# Patient Record
Sex: MALE | Race: WHITE | Employment: FULL TIME | URBAN - METROPOLITAN AREA
[De-identification: names, ages, dates, MRNs, and addresses within clinical notes are randomized per-mention and may not be internally consistent; named-entity substitution may affect disease eponyms.]

---

## 2017-01-04 ENCOUNTER — TELEPHONE (OUTPATIENT)
Dept: SURGERY | Facility: CLINIC | Age: 37
End: 2017-01-04

## 2017-01-04 NOTE — TELEPHONE ENCOUNTER
Pt has vas scheduled in the office in march - asking if there is something sooner at [facility] or the surgery center

## 2017-01-04 NOTE — TELEPHONE ENCOUNTER
patient called and wanted an earlier appointment to have vasectomy that was scheduled in the office on 03/03/17.     Called patient informed that there is an earlier appointment available for 02/09/17 @ 2:00, at the Surgery Center[facility]Outpatient fo

## 2017-01-04 NOTE — TELEPHONE ENCOUNTER
Called patient informed that there is an earlier appointment available for 02/09/17 @ 2:00, at the Surgery Center[facility]Outpatient for Bilateral Vasectomy, patient was given time and date agreed   Nothing further is needed.

## 2017-01-05 NOTE — TELEPHONE ENCOUNTER
Please call patient and make sure that he understands that when vasectomies are performed at the surgery center, he should take the hydrocodone and the diazepam 30 minutes before actual start time of the procedure; if they tell him to only come to the surg

## 2017-02-09 ENCOUNTER — TELEPHONE (OUTPATIENT)
Dept: SURGERY | Facility: CLINIC | Age: 37
End: 2017-02-09

## 2017-02-09 ENCOUNTER — LAB REQUISITION (OUTPATIENT)
Dept: LAB | Facility: HOSPITAL | Age: 37
End: 2017-02-09
Payer: COMMERCIAL

## 2017-02-09 DIAGNOSIS — Z01.89 ENCOUNTER FOR OTHER SPECIFIED SPECIAL EXAMINATIONS: ICD-10-CM

## 2017-02-09 DIAGNOSIS — Z98.52 STATUS POST VASECTOMY: Primary | ICD-10-CM

## 2017-02-09 PROCEDURE — 88302 TISSUE EXAM BY PATHOLOGIST: CPT | Performed by: UROLOGY

## 2017-02-10 NOTE — TELEPHONE ENCOUNTER
2/9/17   BILATERAL VASECTOMY, LOCAL ANESTHESIA  S Kiki Pichardoe  =   Uneventful    Treatment plan --  1. Patient will continue birth control precautions  2.   Office visit in 6 weeks; to submit postvasectomy semen analysis to the laboratory a few days bef

## 2017-02-13 ENCOUNTER — TELEPHONE (OUTPATIENT)
Dept: SURGERY | Facility: CLINIC | Age: 37
End: 2017-02-13

## 2017-02-13 NOTE — TELEPHONE ENCOUNTER
pts wife, Mike Daugherty called to make a 6 week post-op for her . Surgery was on 2/9/17. There are no available openings. Please advise.

## 2017-02-15 NOTE — TELEPHONE ENCOUNTER
Returned wife's call and asked to speak with pt. She states he is resting and she was trying to schedule his six week post op appt.  fov offered 3/23rd, with the understanding that this is 's surgery day, and there is always the possibility that it wi

## 2017-03-15 ENCOUNTER — TELEPHONE (OUTPATIENT)
Dept: SURGERY | Facility: CLINIC | Age: 37
End: 2017-03-15

## 2017-03-15 NOTE — TELEPHONE ENCOUNTER
Pts spouse asking if pt can do post VAS semen lab this week and get results via phone, and have appt later ? States pt had to rs post VAS appt from 3/23 to 5/3.  Pls advise thank you

## 2017-03-16 NOTE — TELEPHONE ENCOUNTER
Spoke with pt's spouse and offered an appt for 4/6 at 11:20am and explained that this is PVK's typical surgery day so the appt may need to be reschd. If PVK comes up with a surgery that he needs to put on his schd.  She understands this and accepts the appt

## 2017-05-01 ENCOUNTER — TELEPHONE (OUTPATIENT)
Dept: SURGERY | Facility: CLINIC | Age: 37
End: 2017-05-01

## 2017-05-01 NOTE — TELEPHONE ENCOUNTER
Patients wife states patient is still experiencing tenderness in genital area after vasectomy. Surgery was about 3 months ago. Please advise.  Thank you

## 2017-05-02 NOTE — TELEPHONE ENCOUNTER
I spoke with pt's spouse and informed her that I do not have a Signed EDUARDO on file to speak with her on pt's behalf. She then asked if I could contact pt at work on his cell.    I called pt and determined that he is still experiencing mild tenderness in the

## 2017-05-02 NOTE — TELEPHONE ENCOUNTER
Please call patient back. Please have him take ibuprofen 200 mg 3 times daily with food for the next 10-14 days, without exception. This should help; patient should continue to take it with food until he is pain-free for 3 days.   If pain does not resolve

## 2017-05-04 NOTE — TELEPHONE ENCOUNTER
I spoke with pt and informed him of JAY's msg below and he verbalized understanding and will comply.

## 2017-08-20 ENCOUNTER — APPOINTMENT (OUTPATIENT)
Dept: LAB | Facility: HOSPITAL | Age: 37
End: 2017-08-20
Attending: UROLOGY
Payer: COMMERCIAL

## 2017-08-20 DIAGNOSIS — Z98.52 STATUS POST VASECTOMY: ICD-10-CM

## 2017-08-20 PROCEDURE — 89321 SEMEN ANAL SPERM DETECTION: CPT

## 2017-08-21 ENCOUNTER — OFFICE VISIT (OUTPATIENT)
Dept: SURGERY | Facility: CLINIC | Age: 37
End: 2017-08-21

## 2017-08-21 VITALS
SYSTOLIC BLOOD PRESSURE: 126 MMHG | HEART RATE: 82 BPM | TEMPERATURE: 98 F | DIASTOLIC BLOOD PRESSURE: 80 MMHG | WEIGHT: 170 LBS | RESPIRATION RATE: 16 BRPM | HEIGHT: 71 IN | BODY MASS INDEX: 23.8 KG/M2

## 2017-08-21 DIAGNOSIS — Z98.52 STATUS POST VASECTOMY: ICD-10-CM

## 2017-08-21 DIAGNOSIS — Z30.8 POSTVASECTOMY SPERM COUNT: Primary | ICD-10-CM

## 2017-08-21 PROCEDURE — 99213 OFFICE O/P EST LOW 20 MIN: CPT | Performed by: UROLOGY

## 2017-08-21 PROCEDURE — 99212 OFFICE O/P EST SF 10 MIN: CPT | Performed by: UROLOGY

## 2017-08-21 NOTE — PROGRESS NOTES
Pt failed to come in after 6 weeks after procedure. The patient comes in for his first visit after his office bilateral vasectomy six months after procedure. He has no complaints. He denies any significant pain.   He denies any scrotal swelling or wound back together again and the patient becoming  fertile again. The patient states he decides he does not want another semen analysis . I answered all of the patient’s questions.   I also reminded patient that he needs to get a blood draw for PSA as well as a

## 2017-08-21 NOTE — PATIENT INSTRUCTIONS
1.  I explained all the benefits and risks and you have decided to stop birth control precautions and that is a reasonable option    2.   Please return to see us as needed

## 2018-04-10 ENCOUNTER — NURSE TRIAGE (OUTPATIENT)
Dept: INTERNAL MEDICINE CLINIC | Facility: CLINIC | Age: 38
End: 2018-04-10

## 2018-04-10 NOTE — TELEPHONE ENCOUNTER
Action Requested: Summary for Provider     []  Critical Lab, Recommendations Needed  [] Need Additional Advice  [x]   FYI    []   Need Orders  [] Need Medications Sent to Pharmacy  []  Other     SUMMARY: Wife called states her  has been having occas

## 2018-04-10 NOTE — TELEPHONE ENCOUNTER
Reason for Disposition  • MILD rectal bleeding (more than just a few drops or streaks)    Protocols used: RECTAL BLEEDING-ADULT-[place]

## 2018-04-11 ENCOUNTER — OFFICE VISIT (OUTPATIENT)
Dept: INTERNAL MEDICINE CLINIC | Facility: CLINIC | Age: 38
End: 2018-04-11

## 2018-04-11 VITALS
BODY MASS INDEX: 26.34 KG/M2 | HEIGHT: 71 IN | WEIGHT: 188.13 LBS | TEMPERATURE: 98 F | SYSTOLIC BLOOD PRESSURE: 128 MMHG | RESPIRATION RATE: 18 BRPM | HEART RATE: 94 BPM | DIASTOLIC BLOOD PRESSURE: 78 MMHG

## 2018-04-11 DIAGNOSIS — K60.2 ANAL FISSURE: Primary | ICD-10-CM

## 2018-04-11 PROCEDURE — 99213 OFFICE O/P EST LOW 20 MIN: CPT | Performed by: INTERNAL MEDICINE

## 2018-04-11 PROCEDURE — 99212 OFFICE O/P EST SF 10 MIN: CPT | Performed by: INTERNAL MEDICINE

## 2018-04-11 NOTE — PROGRESS NOTES
HPI:    Patient ID: Alvina Rahman is a 40year old male. HPI  Patient is here with 6 months of perirectal discomfort. Typically occurs after he moves his bowels. Because issues when he sitting for a while. Feels like a burning sensation.   Occasional

## 2019-05-27 ENCOUNTER — APPOINTMENT (OUTPATIENT)
Dept: CT IMAGING | Facility: HOSPITAL | Age: 39
End: 2019-05-27
Attending: EMERGENCY MEDICINE
Payer: COMMERCIAL

## 2019-05-27 ENCOUNTER — HOSPITAL ENCOUNTER (EMERGENCY)
Facility: HOSPITAL | Age: 39
Discharge: HOME | End: 2019-05-27
Attending: EMERGENCY MEDICINE
Payer: COMMERCIAL

## 2019-05-27 VITALS
RESPIRATION RATE: 18 BRPM | BODY MASS INDEX: 26 KG/M2 | WEIGHT: 187.38 LBS | HEART RATE: 72 BPM | DIASTOLIC BLOOD PRESSURE: 62 MMHG | TEMPERATURE: 97 F | OXYGEN SATURATION: 96 % | SYSTOLIC BLOOD PRESSURE: 111 MMHG

## 2019-05-27 DIAGNOSIS — N20.0 NEPHROLITHIASIS: Primary | ICD-10-CM

## 2019-05-27 PROCEDURE — 96375 TX/PRO/DX INJ NEW DRUG ADDON: CPT

## 2019-05-27 PROCEDURE — 99284 EMERGENCY DEPT VISIT MOD MDM: CPT

## 2019-05-27 PROCEDURE — 96361 HYDRATE IV INFUSION ADD-ON: CPT

## 2019-05-27 PROCEDURE — 96376 TX/PRO/DX INJ SAME DRUG ADON: CPT

## 2019-05-27 PROCEDURE — 81001 URINALYSIS AUTO W/SCOPE: CPT | Performed by: EMERGENCY MEDICINE

## 2019-05-27 PROCEDURE — 96374 THER/PROPH/DIAG INJ IV PUSH: CPT

## 2019-05-27 PROCEDURE — 80048 BASIC METABOLIC PNL TOTAL CA: CPT | Performed by: EMERGENCY MEDICINE

## 2019-05-27 PROCEDURE — 74176 CT ABD & PELVIS W/O CONTRAST: CPT | Performed by: EMERGENCY MEDICINE

## 2019-05-27 PROCEDURE — 85025 COMPLETE CBC W/AUTO DIFF WBC: CPT | Performed by: EMERGENCY MEDICINE

## 2019-05-27 RX ORDER — TAMSULOSIN HYDROCHLORIDE 0.4 MG/1
0.4 CAPSULE ORAL DAILY
Qty: 7 CAPSULE | Refills: 0 | Status: SHIPPED | OUTPATIENT
Start: 2019-05-27 | End: 2019-06-03

## 2019-05-27 RX ORDER — ONDANSETRON 2 MG/ML
4 INJECTION INTRAMUSCULAR; INTRAVENOUS ONCE
Status: COMPLETED | OUTPATIENT
Start: 2019-05-27 | End: 2019-05-27

## 2019-05-27 RX ORDER — MORPHINE SULFATE 4 MG/ML
2 INJECTION, SOLUTION INTRAMUSCULAR; INTRAVENOUS ONCE
Status: COMPLETED | OUTPATIENT
Start: 2019-05-27 | End: 2019-05-27

## 2019-05-27 RX ORDER — TRAMADOL HYDROCHLORIDE 50 MG/1
50 TABLET ORAL EVERY 8 HOURS PRN
Qty: 15 TABLET | Refills: 0 | Status: SHIPPED | OUTPATIENT
Start: 2019-05-27 | End: 2019-06-01

## 2019-05-27 RX ORDER — MELOXICAM 7.5 MG/1
7.5 TABLET ORAL DAILY
Qty: 14 TABLET | Refills: 0 | Status: SHIPPED | OUTPATIENT
Start: 2019-05-27 | End: 2019-06-10

## 2019-05-27 RX ORDER — ONDANSETRON 4 MG/1
4 TABLET, ORALLY DISINTEGRATING ORAL EVERY 8 HOURS PRN
Qty: 15 TABLET | Refills: 0 | Status: SHIPPED | OUTPATIENT
Start: 2019-05-27 | End: 2019-06-01

## 2019-05-27 RX ORDER — ALFUZOSIN HYDROCHLORIDE 10 MG/1
10 TABLET, EXTENDED RELEASE ORAL ONCE
Status: COMPLETED | OUTPATIENT
Start: 2019-05-27 | End: 2019-05-27

## 2019-05-27 RX ORDER — KETOROLAC TROMETHAMINE 15 MG/ML
15 INJECTION, SOLUTION INTRAMUSCULAR; INTRAVENOUS ONCE
Status: COMPLETED | OUTPATIENT
Start: 2019-05-27 | End: 2019-05-27

## 2019-05-27 NOTE — ED PROVIDER NOTES
Patient Seen in: [facility] Department    History   Patient presents with:  Abdomen/Flank Pain (GI/)    Stated Complaint:  Flank pain    HPI    46 yo M without PMH and one year s/p vasectomy presenting for evaluation of one hour of right Constitutional: Uncomfortable appearing, pacing about room. HEENT: MMM. Head: Normocephalic. Eyes: No injection. Cardiovascular: RRR. Pulmonary/Chest: Effort normal. CTAB. Abdominal: Soft. Right flank pain without cutaneous/crpeitant change.   Mu images of the abdomen and pelvis were obtained without intravenous contrast material.  Automated exposure control for dose reduction was used. Adjustment of the mA and/or kV was done based on the patient's size.  Use of iterative reconstruction technique fo Dictated by (CST): Pablo Perea MD on 5/27/2019 at 20:01     Approved by (CST): Pablo Perea MD on 5/27/2019 at 20:05          MDM     DIFFERENTIAL DIAGNOSIS: After history and physical exam differential diagnosis includes but is not limited to nephrolithi

## 2019-05-27 NOTE — ED NOTES
Pt c/o right flank pain that radiates to right lower quad. Pt unable to lie down, is restless with the pain. Having difficulty with urinating.  Nausea occasional vomiting

## 2019-05-27 NOTE — ED INITIAL ASSESSMENT (HPI)
C/o left sided flank pain + vomiting x 1 hour.  Wife states he has been urinating more frequently and has been experiencing \"pressure\" since friday

## 2019-05-28 NOTE — ED NOTES
Assumed care. States pain is starting to come back. Denies nausea. Updated on POC. Family present at bedside.

## 2019-05-28 NOTE — ED NOTES
Discharge instructions reviewed. Pt verbalized understanding with no further questions. Scripts given to patient. Pain well controlled. Steady gait. Denies nausea. Spouse present for discharge instructions.

## 2024-04-09 ENCOUNTER — OFFICE VISIT (OUTPATIENT)
Dept: DERMATOLOGY CLINIC | Facility: CLINIC | Age: 44
End: 2024-04-09

## 2024-04-09 DIAGNOSIS — A63.0 CONDYLOMA ACUMINATUM: Primary | ICD-10-CM

## 2024-04-09 PROCEDURE — 99204 OFFICE O/P NEW MOD 45 MIN: CPT | Performed by: STUDENT IN AN ORGANIZED HEALTH CARE EDUCATION/TRAINING PROGRAM

## 2024-04-09 PROCEDURE — 17110 DESTRUCTION B9 LES UP TO 14: CPT | Performed by: STUDENT IN AN ORGANIZED HEALTH CARE EDUCATION/TRAINING PROGRAM

## 2024-04-09 RX ORDER — IMIQUIMOD 12.5 MG/.25G
CREAM TOPICAL
Qty: 24 EACH | Refills: 0 | Status: SHIPPED | OUTPATIENT
Start: 2024-04-09

## 2024-04-09 NOTE — PROGRESS NOTES
New Patient    Referred by: No referring provider defined for this encounter.    CHIEF COMPLAINT: Lesion of concern     HISTORY OF PRESENT ILLNESS: Daniel Painter is a 43 year old male here for evaluation of lesion of concern.    1. Growth   Location: Testicle(s)  Duration: Years   Signs and symptoms: Raised   Current treatment: None   Past treatments: None        Personal Dermatologic History  History of skin cancer: No  History of  atypical moles: No    FAMILY HISTORY:  History of melanoma: No    Past Medical History  No past medical history on file.    REVIEW OF SYSTEMS:  Constitutional: Denies fever, chills, unintentional weight loss.   Skin as per HPI    Medications  Current Outpatient Medications   Medication Sig Dispense Refill    Nitroglycerin (RECTIV) 0.4 % Rectal Ointment Apply to rectal area twice a day 1 Tube 1       PHYSICAL EXAM:  General: awake, alert, no acute distress  Neuropsych: appropriate mood and affect  Eyes: Sclerae anicteric, without conjunctival injection, eyelids unremarkable  Skin: Skin exam was performed today including the following: penis. Pertinent findings include:   - penile shaft with 3 brown papules    ASSESSMENT & PLAN:  Pathophysiology of diagnoses discussed with patient.  Therapeutic options reviewed. Risks, benefits, and alternatives discussed with patient. Instructions reviewed at length.    #Condyloma Acuminatum  - Discussed viral etiology (HPV)  - Recommended HPV vaccine    - Imiquimod 5% cream should be applied once daily at bedtime to affected areas. Apply three times a week for up to 16 weeks.  Patient counseled to wash hands prior to and after application. Patient aware to expect redness, irritation, crusting, oozing, and an open sore to develop. Discussed other potential side effects including systemic reaction with flu-like symptoms. Will call if severe pain, irritation, ulceration or other symptoms occur.      Procedure Note Cryosurgery of benign lesion(s)  Risks,  benefits, alternatives, complications, and personnel required for cryosurgery reviewed with patient. Specifically, the risks of permanent scar, loss or darkening of skin color, blister and recurrence of lesion were discussed. Pt verbalizes understanding and wishes to proceed.     Cryosurgery performed with Liquid Nitrogen via cryostat spray gun to warts . 3 lesion(s) treated.     Patient tolerated well. Post-op course explained and wound care instructions given.        Follow-up: 4 weeks to assess need for repeat treatments       Romeo Hodges MD

## (undated) NOTE — LETTER
Date & Time: 5/27/2019, 8:46 PM  Patient: Emmett Chris  Encounter Provider(s):    Cecy Leonard MD       To Whom It May Concern:    Emmett Chris was seen and treated in our department on 5/27/2019. He should not return to work until 5/29/2019.     I

## (undated) NOTE — ED AVS SNAPSHOT
Gato Terrazas   MRN: [id number]    Department:  [facility] Emergency Department   Date of Visit:  5/27/2019           Disclosure     Insurance plans vary and the physician(s) referred by the ER may not be covered by your plan.  Please contact y CARE PHYSICIAN AT ONCE OR RETURN IMMEDIATELY TO THE EMERGENCY DEPARTMENT. If you have been prescribed any medication(s), please fill your prescription right away and begin taking the medication(s) as directed.   If you believe that any of the medications